# Patient Record
(demographics unavailable — no encounter records)

---

## 2024-12-16 NOTE — HEALTH RISK ASSESSMENT
[0] : 2) Feeling down, depressed, or hopeless: Not at all (0) [PHQ-2 Negative - No further assessment needed] : PHQ-2 Negative - No further assessment needed [[id number]] : 0 [AdvancecareDate] : 02/23

## 2024-12-16 NOTE — HISTORY OF PRESENT ILLNESS
[FreeTextEntry1] : CPE    R medial small meniscal tear  PT  no surgery No further WART Actinic  keratosis [de-identified] : Mr. KIRSTIN PALACIOS is a 36 year old Black or  Shantel  male  with history of  leukopenia ,thrombocytopenia,  increased BP presented today for comprehensive evaluation. Last seen by Dr. Benavidez 2/27/23.  -------- Refuses flu vaccine SBIRT  marijuana monthly  Dispensary Exercise cardio 5 days per week  Weights Monogamous 1 partner x 3 years Decline flu COVID 2021